# Patient Record
Sex: FEMALE | Race: OTHER | NOT HISPANIC OR LATINO | URBAN - METROPOLITAN AREA
[De-identification: names, ages, dates, MRNs, and addresses within clinical notes are randomized per-mention and may not be internally consistent; named-entity substitution may affect disease eponyms.]

---

## 2023-11-17 ENCOUNTER — EMERGENCY (EMERGENCY)
Facility: HOSPITAL | Age: 28
LOS: 1 days | Discharge: HOME | End: 2023-11-17
Attending: EMERGENCY MEDICINE
Payer: COMMERCIAL

## 2023-11-17 VITALS
HEIGHT: 63 IN | RESPIRATION RATE: 18 BRPM | DIASTOLIC BLOOD PRESSURE: 73 MMHG | SYSTOLIC BLOOD PRESSURE: 105 MMHG | OXYGEN SATURATION: 99 % | HEART RATE: 88 BPM | WEIGHT: 171.96 LBS | TEMPERATURE: 99 F

## 2023-11-17 PROCEDURE — 99283 EMERGENCY DEPT VISIT LOW MDM: CPT

## 2023-11-17 PROCEDURE — 99282 EMERGENCY DEPT VISIT SF MDM: CPT

## 2023-11-17 RX ORDER — ACETAMINOPHEN 500 MG
650 TABLET ORAL ONCE
Refills: 0 | Status: COMPLETED | OUTPATIENT
Start: 2023-11-17 | End: 2023-11-17

## 2023-11-17 NOTE — ED ADULT NURSE NOTE - NSFALLUNIVINTERV_ED_ALL_ED
Bed/Stretcher in lowest position, wheels locked, appropriate side rails in place/Call bell, personal items and telephone in reach/Instruct patient to call for assistance before getting out of bed/chair/stretcher/Non-slip footwear applied when patient is off stretcher/[place] to call system/Physically safe environment - no spills, clutter or unnecessary equipment/Purposeful proactive rounding/Room/bathroom lighting operational, light cord in reach

## 2023-11-17 NOTE — ED PROVIDER NOTE - ATTENDING APP SHARED VISIT CONTRIBUTION OF CARE
28-year-old female no past medical history presenting to emergency department for nasal drainage.  Patient states she noticed 2 episodes of watery nasal discharge from right nostril.  First time she notices when she was leaving the shower, second time today. Went on YouTube and was concerned that this was spinal fluid.  Patient denies history of intracranial surgeries, trauma, other complaint.  Endorsing headache after she thought nasal discharge was spinal fluid.  No cough or congestion noted. Denies chest pain, shortness of breath, abdominal pain, nausea, vomiting, diarrhea, fever, numbness, difficulty ambulating, visual change. PE benign, pt neuro intact, no suspicion for CSF leak. No concern for ICH, space occupying lesion, declines viral panel, will give analgesia.

## 2023-11-17 NOTE — ED PROVIDER NOTE - PHYSICAL EXAMINATION
Gen: NAD, AOx3, able to make needs known, non-toxic  Head: NCAT, no Mcdaniels sign, no raccoon eyes  HEENT: EOMI, oral mucosa moist, normal conjunctiva  Lung: CTAB, no respiratory distress, no wheezes/rhonchi/rales B/L, speaking in full sentences  CV: RRR, no murmurs  Abd: non distended, soft, nontender, no guarding, no CVA tenderness  MSK: no visible deformities  Neuro: Awake and alert. Symmetric eyebrow raise, symmetric eyelid closure. PERRL b/l, EOMI b/l, symmetric smile, tongue midline. symmetric  strength bilaterally, full strength to elbow flexion and extension, full strength b/l shoulder shrug. patient able to straight leg raise against resistance with symmetric strength bilaterally. Sensation intact and symmetric grossly to light touch throughout face and bilateral upper and lower extremities   Skin: Warm, well perfused, no rash  Psych: normal affect

## 2023-11-17 NOTE — ED PROVIDER NOTE - NSFOLLOWUPINSTRUCTIONS_ED_ALL_ED_FT
1) Follow up with your doctor as discussed  2) Return to the ED immediately for new or worsening symptoms   3) Please continue to take any home medications as prescribed  4) Your test results from your ED visit were discussed with you prior to discharge  5) You were provided with a copy of your test results  6) You may take Tylenol and or Motrin for pain

## 2023-11-17 NOTE — ED ADULT NURSE NOTE - TEMPLATE
[place] EMERGENCY DEPARTMENT ENCOUNTER:    [facility] EMERGENCY DEPARTMENT  [address] [facility] [address]  [phone]    CHIEF COMPLAINT:    Chief Complaint   Patient presents with   • Leg Pain       HPI:    This is a 58 year old female who presented to the ED with the the chief complaint of left-sided lower back pain with left-sided sciatica.  Patient states that approximately 5 days ago she was doing some yd work, afterwards she developed some low back pain that radiates down her left leg.  Patient states the pain is currently 10/10 is worse when she stands is relieved mildly with rest.  Patient denies any lower extremity weakness, urinary or stool incontinence, or saddle anesthesia.      ALLERGIES:    ALLERGIES:   Allergen Reactions   • Penicillins Other (See Comments)     Unknown, \"when I was a baby, I must have had a reaction of some sort\"       CURRENT MEDICATIONS:    No current facility-administered medications for this encounter.      Current Outpatient Medications   Medication Sig Dispense Refill   • ibuprofen (MOTRIN) 600 MG tablet Take 1 tablet by mouth every 6 hours as needed for Pain. 30 tablet 0   • cyclobenzaprine (FLEXERIL) 10 MG tablet Take 1 tablet by mouth 3 times daily as needed for Muscle spasms. 15 tablet 0   • warfarin (COUMADIN) 5 MG tablet TAKE 1 TABLET BY MOUTH ON SUNDAY AND WEDNESDAY AND 1.5 TABLETS THE OTHER DAYS 40 tablet 1       PROBLEM LIST:  No   Patient Active Problem List   Diagnosis   • Acute deep vein thrombosis (DVT) of right lower extremity, unspecified vein (CMS/HCC)   • Anticoagulation goal of INR 2 to 3        PAST MEDICAL HISTORY:    History reviewed. No pertinent past medical history.    SURGICAL HISTORY:    Past Surgical History:   Procedure Laterality Date   • Tonsillectomy         SOCIAL HISTORY:    Social History     Tobacco Use   • Smoking status: Never Smoker   • Smokeless tobacco: Never Used   Substance Use Topics   • Alcohol  use: Yes     Comment: Social   • Drug use: Never       FAMILY HISTORY:    History reviewed. No pertinent family history.    REVIEW OF SYSTEMS    Constitutional:  Denies fever or chills.   Eyes:  Denies change in visual acuity.   HENT:  Denies nasal congestion or sore throat.   Respiratory:  Denies cough or shortness of breath.   Cardiovascular:  Denies chest pain or edema.   GI:  Denies abdominal pain, nausea, vomiting, bloody stools or diarrhea.   :  Denies dysuria, frequency, urgency or hematuria.    Musculoskeletal:  Denies trauma, falls.   Integument:  Denies rash or lesion.   Neurologic:  Denies weakness, numbness.    PHYSICAL EXAM    ED Triage Vitals [07/16/20 0545]   BP (!) 154/79   Heart Rate 80   Resp    Temp 97.5 °F (36.4 °C)   SpO2 100 %     Constitutional:  Alert, cooperative, conversive in no acute distress.   Integument:  No rash or lesion.   HEENT:  Sclerae and conjunctivae are normal bilaterally.   Neck:  Trachea is midline.  C-Spine:  FROM.  No posterior midline tenderness, paraspinal tenderness or spasm.   Respiratory:  CTA.  No rales, rhonchi or wheezes.  Cardiovascular:  RRR without murmur.  No edema.    Abdomen:  Non distended, nontender, no hepatosplenomegaly.   Musculoskeletal:  Upper and lower ext nontender bilaterally, normal ROM, no bony step abnormalities.  Back:  Normal alignment.  No CVA or midline bony tenderness.    Neurologic:  Cranial nerve II-XII grossly intact, no focal weakness or numbness.    RADIOLOGY AND LAB RESULTS:  No results found for this visit on 07/16/20.  No orders to display       Vitals:    07/16/20 0545   BP: (!) 154/79   Pulse: 80   Temp: 97.5 °F (36.4 °C)   TempSrc: Temporal   SpO2: 100%   Weight: 80 kg   Height: 5' 8\" (1.727 m)      Medications - No data to display          ED COURSE & MEDICAL DECISION MAKING:     Patient presents to ER with chief complaint of low back pain that radiates down left leg.  On physical examination patient had no midline tenderness  along her back, she does have tenderness to palpation over her left as I joint.  Patient had no focal neurological deficits he has had 5/5 strength throughout 2 out deep tendon reflexes, negative clonus.    Since patient had no traumatic injuries, and his back pain is nontraumatic, patient has no saddle anesthesia lower extremity weakness urinary or stool incontinence or any other concerning findings, there is no indication for x-rays at this time, this was discussed with the patient she is agreeable not have any imaging done.  Discussed possible treatment options with the patient including medications given in the ER including Toradol Valium muscle relaxers and possibly opiate pain medications.  Since patient's driving if she has a fear of needles she declined any medications in the ER but she does feel comfortable going home with a script of ibuprofen and Flexeril.  Discussed risks benefits of ibuprofen since patient is on warfarin, believe short course of ibuprofen would be relatively safe this was discussed with the patient she is agreeable to it she will watch for any dark stools blood in the stools abdomen pain or have any other signs of GI bleed she would stop the ibuprofen immediately and seek medical care.  Patient was instructed follow up with primary care physician 1 2 weeks otherwise if she develops any new change or worsening symptoms to please immediately to the ER for further evaluation treatment    DIAGNOSIS:  1. Acute left-sided low back pain with left-sided sciatica              The patient understands that if sx do not improve, worsen, or there are any other concerns they should immediately return to the ER or call 911/ambulance.     Tonny Escamilla, DO  07/16/20 [id number]     General

## 2023-11-17 NOTE — ED PROVIDER NOTE - OBJECTIVE STATEMENT
28-year-old female no past medical history presenting to emergency department for nasal drainage.  Patient states she noticed 2 episodes of watery nasal discharge from right nostril.  First time she notices when she was leaving the shower, second time today. Went on YouTube and was concerned that this was spinal fluid.  Patient denies history of intracranial surgeries, trauma, other complaint.  Endorsing headache after she thought nasal discharge was spinal fluid.  No cough or congestion noted. Denies chest pain, shortness of breath, abdominal pain, nausea, vomiting, diarrhea, fever, numbness, difficulty ambulating, visual change